# Patient Record
Sex: MALE | Race: WHITE | NOT HISPANIC OR LATINO | Employment: FULL TIME | URBAN - METROPOLITAN AREA
[De-identification: names, ages, dates, MRNs, and addresses within clinical notes are randomized per-mention and may not be internally consistent; named-entity substitution may affect disease eponyms.]

---

## 2018-10-18 ENCOUNTER — OFFICE VISIT (OUTPATIENT)
Dept: URGENT CARE | Facility: CLINIC | Age: 38
End: 2018-10-18
Payer: COMMERCIAL

## 2018-10-18 VITALS
DIASTOLIC BLOOD PRESSURE: 70 MMHG | BODY MASS INDEX: 26.06 KG/M2 | TEMPERATURE: 97.9 F | WEIGHT: 166 LBS | HEART RATE: 73 BPM | OXYGEN SATURATION: 96 % | HEIGHT: 67 IN | SYSTOLIC BLOOD PRESSURE: 128 MMHG

## 2018-10-18 DIAGNOSIS — H00.031 CELLULITIS OF RIGHT UPPER EYELID: Primary | ICD-10-CM

## 2018-10-18 DIAGNOSIS — H01.001 BLEPHARITIS OF RIGHT UPPER EYELID, UNSPECIFIED TYPE: ICD-10-CM

## 2018-10-18 PROCEDURE — 99203 OFFICE O/P NEW LOW 30 MIN: CPT | Performed by: PHYSICIAN ASSISTANT

## 2018-10-18 RX ORDER — AMOXICILLIN AND CLAVULANATE POTASSIUM 875; 125 MG/1; MG/1
1 TABLET, FILM COATED ORAL 2 TIMES DAILY
Qty: 20 TAB | Refills: 0 | Status: SHIPPED | OUTPATIENT
Start: 2018-10-18

## 2018-10-18 RX ORDER — POLYMYXIN B SULFATE AND TRIMETHOPRIM 1; 10000 MG/ML; [USP'U]/ML
1 SOLUTION OPHTHALMIC EVERY 4 HOURS
Qty: 10 ML | Refills: 0 | Status: SHIPPED | OUTPATIENT
Start: 2018-10-18 | End: 2018-10-23

## 2018-10-18 ASSESSMENT — PAIN SCALES - GENERAL: PAINLEVEL: NO PAIN

## 2018-10-18 NOTE — PROGRESS NOTES
"Subjective:      Edmar Caro is a 38 y.o. male who presents with Eye Problem (x 5 days - right eye - hold/cold compress no medication used )    Pt PMH, SocHx, SurgHx, FamHx, Drug allergies and medications reviewed with pt/[facility].      Family history reviewed, it is not pertinent to this complaint.           Eye Problem    The right eye is affected. This is a new problem. The current episode started in the past 7 days. The problem occurs constantly. The problem has been gradually worsening. There was no injury mechanism. The pain is at a severity of 7/10. There is no known exposure to pink eye. He does not wear contacts. Associated symptoms include an eye discharge and eye redness. Pertinent negatives include no blurred vision, double vision, fever, foreign body sensation or photophobia. Treatments tried: compress. The treatment provided no relief.       Review of Systems   Constitutional: Negative for fever.   Eyes: Positive for discharge and redness. Negative for blurred vision, double vision, photophobia and pain.   All other systems reviewed and are negative.         Objective:     /70 (BP Location: Right arm, Patient Position: Sitting, BP Cuff Size: Adult)   Pulse 73   Temp 36.6 °C (97.9 °F) (Temporal)   Ht 1.702 m (5' 7\")   Wt 75.3 kg (166 lb)   SpO2 96%   BMI 26.00 kg/m²      Physical Exam   Constitutional: He is oriented to person, place, and time. He appears well-developed and well-nourished. No distress.   HENT:   Head: Normocephalic and atraumatic.   Nose: Nose normal.   Mouth/Throat: Oropharynx is clear and moist.   Eyes: Pupils are equal, round, and reactive to light. EOM are normal. Lids are everted and swept, no foreign bodies found. Right eye exhibits discharge. Right eye exhibits no hordeolum. Right conjunctiva is not injected. No scleral icterus.       Neck: Normal range of motion. Neck supple. No JVD present.   Cardiovascular: Normal rate, regular rhythm and normal heart " sounds.    Pulmonary/Chest: Effort normal and breath sounds normal.   Abdominal: Soft.   Musculoskeletal: Normal range of motion.   Lymphadenopathy:     He has no cervical adenopathy.   Neurological: He is alert and oriented to person, place, and time.   Skin: Skin is warm and dry.               Assessment/Plan:     1. Cellulitis of right upper eyelid  polymixin-trimethoprim (POLYTRIM) 52144-3.1 UNIT/ML-% Solution    amoxicillin-clavulanate (AUGMENTIN) 875-125 MG Tab   2. Blepharitis of right upper eyelid, unspecified type  polymixin-trimethoprim (POLYTRIM) 54867-3.1 UNIT/ML-% Solution    amoxicillin-clavulanate (AUGMENTIN) 875-125 MG Tab     PT can use cool/warm compress on affected area for relief of symptoms.     Baby shampoo to clean lashes.     PT should follow up with PCP in 1-2 days for re-evaluation if symptoms have not improved.  Discussed red flags and reasons to return to UC or ED.  Pt and/or family verbalized understanding of diagnosis and follow up instructions and was offered informational handout on diagnosis.  PT discharged.

## 2018-10-19 ASSESSMENT — ENCOUNTER SYMPTOMS
EYE REDNESS: 1
DOUBLE VISION: 0
FEVER: 0
EYE PAIN: 0
BLURRED VISION: 0
EYE DISCHARGE: 1
FOREIGN BODY SENSATION: 0
PHOTOPHOBIA: 0

## 2018-10-19 NOTE — PATIENT INSTRUCTIONS
Blepharitis  Introduction  Blepharitis is inflammation of the eyelids. Blepharitis may happen with:  · Reddish, scaly skin around the scalp and eyebrows.  · Burning or itching of the eyelids.  · Eye discharge at night that causes the eyelashes to stick together in the morning.  · Eyelashes that fall out.  · Sensitivity to light.  Follow these instructions at home:  Pay attention to any changes in how you look or feel. Follow these instructions to help with your condition:  Keeping Clean  · Wash your hands often.  · Wash your eyelids with warm water or with warm water that is mixed with a small amount of baby shampoo. Do this two times per day or as often as needed.  · Wash your face and eyebrows at least once a day.  · Use a clean towel each time you dry your eyelids. Do not use this towel to clean or dry other areas of your body. Do not share your towel with anyone.  General instructions  · Avoid wearing makeup until you get better. Do not share makeup with anyone.  · Avoid rubbing your eyes.  · Apply warm compresses to your eyes 2 times per day for 10 minutes at a time, or as told by your health care provider.  · If you were prescribed an antibiotic ointment or steroid drops, apply or use the medicine as told by your health care provider. Do not stop using the medicine even if you feel better.  · Keep all follow-up visits as told by your health care provider. This is important.  Contact a health care provider if:  · Your eyelids feel hot.  · You have blisters or a rash on your eyelids.  · The condition does not go away in 2-4 days.  · The inflammation gets worse.  Get help right away if:  · You have pain or redness that gets worse or spreads to other parts of your face.  · Your vision changes.  · You have pain when looking at lights or moving objects.  · You have a fever.  This information is not intended to replace advice given to you by your health care provider. Make sure you discuss any questions you have with  your health care provider.  Document Released: 12/15/2001 Document Revised: 05/25/2017 Document Reviewed: 04/11/2016  © 2017 [employer]